# Patient Record
Sex: FEMALE | Race: OTHER | Employment: OTHER | URBAN - METROPOLITAN AREA
[De-identification: names, ages, dates, MRNs, and addresses within clinical notes are randomized per-mention and may not be internally consistent; named-entity substitution may affect disease eponyms.]

---

## 2018-10-29 PROBLEM — K50.00 REGIONAL ENTERITIS OF SMALL INTESTINE (HCC): Status: ACTIVE | Noted: 2017-10-17

## 2018-10-29 PROBLEM — K50.90 CROHN'S DISEASE (HCC): Status: ACTIVE | Noted: 2017-08-09

## 2020-10-07 ENCOUNTER — TELEPHONE (OUTPATIENT)
Dept: CASE MANAGEMENT | Age: 80
End: 2020-10-07

## 2020-10-07 NOTE — TELEPHONE ENCOUNTER
Need to inform patient to please call [employer] to notify them that Dr. Mariama Vasquez is not her PCP, message left on maximo Pettit cell phone to call back [phone].

## 2020-11-04 ENCOUNTER — TELEPHONE (OUTPATIENT)
Dept: CASE MANAGEMENT | Age: 80
End: 2020-11-04

## 2020-11-04 NOTE — TELEPHONE ENCOUNTER
Need to inform patient to please call [employer] to notify them that Dr. Holley Stanton is not her PCP, message left on daughter Alejandro Colon cell to please call [phone].